# Patient Record
Sex: FEMALE | Race: WHITE | NOT HISPANIC OR LATINO | Employment: OTHER | URBAN - METROPOLITAN AREA
[De-identification: names, ages, dates, MRNs, and addresses within clinical notes are randomized per-mention and may not be internally consistent; named-entity substitution may affect disease eponyms.]

---

## 2018-03-02 ENCOUNTER — NEW PATIENT COMPREHENSIVE (OUTPATIENT)
Dept: URBAN - METROPOLITAN AREA CLINIC 39 | Facility: CLINIC | Age: 68
End: 2018-03-02

## 2018-03-02 DIAGNOSIS — H25.813: ICD-10-CM

## 2018-03-02 DIAGNOSIS — H04.123: ICD-10-CM

## 2018-03-02 DIAGNOSIS — H40.013: ICD-10-CM

## 2018-03-02 DIAGNOSIS — H40.033: ICD-10-CM

## 2018-03-02 PROCEDURE — 92133 CPTRZD OPH DX IMG PST SGM ON: CPT

## 2018-03-02 PROCEDURE — 92132 CPTRZD OPH DX IMG ANT SGM: CPT

## 2018-03-02 PROCEDURE — 92004 COMPRE OPH EXAM NEW PT 1/>: CPT

## 2018-03-02 PROCEDURE — 92015 DETERMINE REFRACTIVE STATE: CPT

## 2018-03-02 ASSESSMENT — TONOMETRY
OD_IOP_MMHG: 12
OS_IOP_MMHG: 12

## 2018-03-02 ASSESSMENT — VISUAL ACUITY
OS_CC: J4
OS_CC: 20/25-1
OD_CC: J3
OS_BAT: 20/400
OD_SC: 20/400
OD_BAT: 20/400
OS_SC: 20/400
OD_CC: 20/40

## 2018-03-27 ENCOUNTER — CATARACT CONSULT (OUTPATIENT)
Dept: URBAN - METROPOLITAN AREA CLINIC 39 | Facility: CLINIC | Age: 68
End: 2018-03-27

## 2018-03-27 VITALS — HEART RATE: 79 BPM | SYSTOLIC BLOOD PRESSURE: 138 MMHG | HEIGHT: 55 IN | DIASTOLIC BLOOD PRESSURE: 72 MMHG

## 2018-03-27 DIAGNOSIS — H40.033: ICD-10-CM

## 2018-03-27 DIAGNOSIS — H04.123: ICD-10-CM

## 2018-03-27 DIAGNOSIS — H25.811: ICD-10-CM

## 2018-03-27 DIAGNOSIS — H25.812: ICD-10-CM

## 2018-03-27 PROCEDURE — 92136TC INTERFEROMETRY - TECHNICAL COMPONENT

## 2018-03-27 PROCEDURE — 92025-2 CORNEAL TOPOGRAPHY, PT

## 2018-03-27 PROCEDURE — 92014 COMPRE OPH EXAM EST PT 1/>: CPT

## 2018-03-27 RX ORDER — DUREZOL 0.5 MG/ML: 1 EMULSION OPHTHALMIC TWICE A DAY

## 2018-03-27 RX ORDER — NEPAFENAC 3 MG/ML: 1 SUSPENSION/ DROPS OPHTHALMIC ONCE A DAY

## 2018-03-27 ASSESSMENT — VISUAL ACUITY
OD_CC: J1
OD_GLARE: 20/400
OS_GLARE: 20/400
OS_SC: <J12
OD_SC: <J12
OD_SC: 20/400
OS_CC: 20/40
OD_CC: 20/40
OS_CC: J3
OS_SC: 20/400

## 2018-03-27 ASSESSMENT — TONOMETRY
OS_IOP_MMHG: 13
OD_IOP_MMHG: 13

## 2018-04-26 ENCOUNTER — SURGERY/PROCEDURE (OUTPATIENT)
Dept: URBAN - METROPOLITAN AREA CLINIC 39 | Facility: CLINIC | Age: 68
End: 2018-04-26

## 2018-04-26 DIAGNOSIS — H25.811: ICD-10-CM

## 2018-04-26 PROCEDURE — 66984AV REMOVE CATARACT, INSERT ADVANCED LENS

## 2018-04-26 PROCEDURE — 65772LRI LRI DURING CAT SX

## 2018-04-26 PROCEDURE — 66999LNSR LENSAR LASER FOR CAT SX

## 2018-04-27 ENCOUNTER — CATARACT POST-OP 1-DAY (OUTPATIENT)
Dept: URBAN - METROPOLITAN AREA CLINIC 39 | Facility: CLINIC | Age: 68
End: 2018-04-27

## 2018-04-27 DIAGNOSIS — Z96.1: ICD-10-CM

## 2018-04-27 PROCEDURE — 99024 POSTOP FOLLOW-UP VISIT: CPT

## 2018-04-27 ASSESSMENT — VISUAL ACUITY
OS_SC: <J12
OD_SC: J2+1
OD_SC: 20/40
OS_SC: 20/400

## 2018-04-27 ASSESSMENT — TONOMETRY
OD_IOP_MMHG: 15
OS_IOP_MMHG: 11

## 2018-04-30 ENCOUNTER — POST OP/EVAL OF SECOND EYE (OUTPATIENT)
Dept: URBAN - METROPOLITAN AREA CLINIC 39 | Facility: CLINIC | Age: 68
End: 2018-04-30

## 2018-04-30 DIAGNOSIS — Z96.1: ICD-10-CM

## 2018-04-30 DIAGNOSIS — H25.812: ICD-10-CM

## 2018-04-30 PROCEDURE — 99024 POSTOP FOLLOW-UP VISIT: CPT

## 2018-04-30 PROCEDURE — 92012 INTRM OPH EXAM EST PATIENT: CPT

## 2018-04-30 ASSESSMENT — TONOMETRY
OD_IOP_MMHG: 17
OS_IOP_MMHG: 15

## 2018-04-30 ASSESSMENT — VISUAL ACUITY
OS_BAT: 20/400
OS_SC: 20/400
OD_SC: 20/30+2
OD_SC: J3
OD_OTHER: J3 @ 30"

## 2018-05-03 ENCOUNTER — FOLLOW UP (OUTPATIENT)
Dept: URBAN - METROPOLITAN AREA SURGERY 14 | Facility: SURGERY | Age: 68
End: 2018-05-03

## 2018-05-03 ENCOUNTER — SURGERY/PROCEDURE (OUTPATIENT)
Dept: URBAN - METROPOLITAN AREA CLINIC 39 | Facility: CLINIC | Age: 68
End: 2018-05-03

## 2018-05-03 DIAGNOSIS — H25.812: ICD-10-CM

## 2018-05-03 PROCEDURE — 65772LRI LRI DURING CAT SX

## 2018-05-03 PROCEDURE — 66984AV REMOVE CATARACT, INSERT ADVANCED LENS

## 2018-05-03 PROCEDURE — 99212 OFFICE O/P EST SF 10 MIN: CPT

## 2018-05-03 PROCEDURE — 66999LNSR LENSAR LASER FOR CAT SX

## 2018-05-03 ASSESSMENT — VISUAL ACUITY
OD_PH: 20/30-2
OD_SC: 20/40
OS_SC: J12>
OS_SC: 20/400
OS_GLARE: 20/400
OS_PH: 20/40

## 2018-05-04 ENCOUNTER — CATARACT POST-OP 1-DAY (OUTPATIENT)
Dept: URBAN - METROPOLITAN AREA CLINIC 39 | Facility: CLINIC | Age: 68
End: 2018-05-04

## 2018-05-04 DIAGNOSIS — Z96.1: ICD-10-CM

## 2018-05-04 PROCEDURE — 99024 POSTOP FOLLOW-UP VISIT: CPT

## 2018-05-04 ASSESSMENT — VISUAL ACUITY
OD_OTHER: J2 @ 30"
OS_SC: 20/100
OU_OTHER: J3 @ 30"
OU_SC: J1
OS_OTHER: J5 @ 30"
OU_SC: 20/50+1
OD_SC: J2
OS_SC: J2

## 2018-05-04 ASSESSMENT — TONOMETRY
OS_IOP_MMHG: 22
OD_IOP_MMHG: 14
OS_IOP_MMHG: 24
OD_IOP_MMHG: 20

## 2018-05-25 ENCOUNTER — POST-OP CATARACT (OUTPATIENT)
Dept: URBAN - METROPOLITAN AREA CLINIC 39 | Facility: CLINIC | Age: 68
End: 2018-05-25

## 2018-05-25 DIAGNOSIS — Z96.1: ICD-10-CM

## 2018-05-25 PROCEDURE — 99024 POSTOP FOLLOW-UP VISIT: CPT

## 2018-05-25 RX ORDER — CARBOXYMETHYLCELLULOSE SODIUM AND GLYCERIN 5; 9 MG/ML; MG/ML: 1 SOLUTION/ DROPS OPHTHALMIC EVERY EVENING

## 2018-05-25 ASSESSMENT — VISUAL ACUITY
OS_SC: 20/50-2
OU_SC: 20/40
OD_SC: 20/60
OS_SC: J1
OU_SC: J1
OD_SC: J1

## 2018-05-25 ASSESSMENT — TONOMETRY
OD_IOP_MMHG: 10
OS_IOP_MMHG: 10

## 2018-06-25 ENCOUNTER — POST-OP CATARACT (OUTPATIENT)
Dept: URBAN - METROPOLITAN AREA CLINIC 39 | Facility: CLINIC | Age: 68
End: 2018-06-25

## 2018-06-25 DIAGNOSIS — Z96.1: ICD-10-CM

## 2018-06-25 PROCEDURE — 99024 POSTOP FOLLOW-UP VISIT: CPT

## 2018-06-25 ASSESSMENT — VISUAL ACUITY
OD_OTHER: J3 @ 30"
OU_OTHER: J2 @ 30"
OU_SC: 20/40
OS_OTHER: J2 @ 30"
OD_SC: J2
OS_SC: J2
OD_SC: 20/80
OS_SC: 20/40
OU_SC: J1+

## 2018-06-25 ASSESSMENT — TONOMETRY
OD_IOP_MMHG: 11
OS_IOP_MMHG: 12

## 2018-07-09 ENCOUNTER — POST-OP CATARACT (OUTPATIENT)
Dept: URBAN - METROPOLITAN AREA CLINIC 39 | Facility: CLINIC | Age: 68
End: 2018-07-09

## 2018-07-09 DIAGNOSIS — Z96.1: ICD-10-CM

## 2018-07-09 PROCEDURE — 99024 POSTOP FOLLOW-UP VISIT: CPT

## 2018-07-09 ASSESSMENT — TONOMETRY
OD_IOP_MMHG: 15
OS_IOP_MMHG: 14

## 2018-07-09 ASSESSMENT — VISUAL ACUITY
OD_SC: J1
OS_SC: J1+
OD_PH: 20/40
OS_SC: 20/70
OS_PH: 20/40
OD_SC: 20/70

## 2018-08-08 ENCOUNTER — ESTABLISHED PATIENT (OUTPATIENT)
Dept: URBAN - METROPOLITAN AREA CLINIC 39 | Facility: CLINIC | Age: 68
End: 2018-08-08

## 2018-08-08 ENCOUNTER — SURGERY/PROCEDURE (OUTPATIENT)
Dept: URBAN - METROPOLITAN AREA SURGERY 14 | Facility: SURGERY | Age: 68
End: 2018-08-08

## 2018-08-08 VITALS — HEART RATE: 67 BPM | SYSTOLIC BLOOD PRESSURE: 164 MMHG | HEIGHT: 55 IN | DIASTOLIC BLOOD PRESSURE: 95 MMHG

## 2018-08-08 DIAGNOSIS — H26.491: ICD-10-CM

## 2018-08-08 DIAGNOSIS — H26.492: ICD-10-CM

## 2018-08-08 PROCEDURE — 66821 AFTER CATARACT LASER SURGERY: CPT

## 2018-08-08 PROCEDURE — G8785 BP SCRN NO PERF AT INTERVAL: HCPCS

## 2018-08-08 PROCEDURE — 4040F PNEUMOC VAC/ADMIN/RCVD: CPT

## 2018-08-08 PROCEDURE — G8482 FLU IMMUNIZE ORDER/ADMIN: HCPCS

## 2018-08-08 PROCEDURE — 92014 COMPRE OPH EXAM EST PT 1/>: CPT

## 2018-08-08 PROCEDURE — 1036F TOBACCO NON-USER: CPT

## 2018-08-08 PROCEDURE — G8428 CUR MEDS NOT DOCUMENT: HCPCS

## 2018-08-08 ASSESSMENT — TONOMETRY
OS_IOP_MMHG: 10
OD_IOP_MMHG: 10

## 2018-08-08 ASSESSMENT — VISUAL ACUITY
OD_SC: J2
OS_SC: 20/40
OD_SC: 20/40
OD_GLARE: 20/80
OS_SC: J3

## 2018-08-15 ENCOUNTER — ESTABLISHED PATIENT (OUTPATIENT)
Dept: URBAN - METROPOLITAN AREA CLINIC 39 | Facility: CLINIC | Age: 68
End: 2018-08-15

## 2018-08-15 ENCOUNTER — SURGERY/PROCEDURE (OUTPATIENT)
Dept: URBAN - METROPOLITAN AREA SURGERY 14 | Facility: SURGERY | Age: 68
End: 2018-08-15

## 2018-08-15 DIAGNOSIS — H26.492: ICD-10-CM

## 2018-08-15 PROCEDURE — 66821 AFTER CATARACT LASER SURGERY: CPT

## 2018-08-15 PROCEDURE — 99213 OFFICE O/P EST LOW 20 MIN: CPT

## 2018-08-15 ASSESSMENT — VISUAL ACUITY
OS_SC: 20/40+1
OD_SC: 20/40+1
OU_SC: 20/30+1
OS_GLARE: 20/70

## 2018-08-15 ASSESSMENT — TONOMETRY
OS_IOP_MMHG: 11
OD_IOP_MMHG: 11

## 2018-08-29 ENCOUNTER — YAG POST-OP (OUTPATIENT)
Dept: URBAN - METROPOLITAN AREA CLINIC 39 | Facility: CLINIC | Age: 68
End: 2018-08-29

## 2018-08-29 DIAGNOSIS — Z98.890: ICD-10-CM

## 2018-08-29 PROCEDURE — 99024 POSTOP FOLLOW-UP VISIT: CPT

## 2018-08-29 ASSESSMENT — VISUAL ACUITY
OU_SC: 20/30 FUZZY
OS_SC: J2
OU_SC: J1+
OD_SC: J1

## 2018-08-29 ASSESSMENT — TONOMETRY
OS_IOP_MMHG: 10
OD_IOP_MMHG: 9

## 2018-09-12 ENCOUNTER — POST-OP (OUTPATIENT)
Dept: URBAN - METROPOLITAN AREA CLINIC 39 | Facility: CLINIC | Age: 68
End: 2018-09-12

## 2018-09-12 DIAGNOSIS — Z96.1: ICD-10-CM

## 2018-09-12 DIAGNOSIS — Z98.890: ICD-10-CM

## 2018-09-12 PROCEDURE — 99024 POSTOP FOLLOW-UP VISIT: CPT

## 2018-09-12 PROCEDURE — 92025NC COMP. CORNEAL TOPO, UNI OR BILAT,

## 2018-09-12 RX ORDER — AMOXICILLIN 500 MG/1: 1 CAPSULE ORAL

## 2018-09-12 RX ORDER — PREDNISOLONE ACETATE 10 MG/ML: 1 SUSPENSION/ DROPS OPHTHALMIC

## 2018-09-12 ASSESSMENT — VISUAL ACUITY
OU_SC: 20/40
OS_SC: J3
OD_SC: 20/60-1
OS_SC: 20/40
OD_SC: J1
OU_SC: J1

## 2018-09-12 ASSESSMENT — TONOMETRY
OS_IOP_MMHG: 14
OD_IOP_MMHG: 14

## 2018-10-04 ENCOUNTER — SURGERY/PROCEDURE (OUTPATIENT)
Dept: URBAN - METROPOLITAN AREA CLINIC 39 | Facility: CLINIC | Age: 68
End: 2018-10-04

## 2018-10-04 DIAGNOSIS — H52.7: ICD-10-CM

## 2018-10-04 PROCEDURE — 66999ISPP INTRALASE LASIK S/P ADVANCED / CUSTOM VISION < 12 MONTHS

## 2018-10-05 ENCOUNTER — 1 DAY LASIK POST OP (OUTPATIENT)
Dept: URBAN - METROPOLITAN AREA CLINIC 39 | Facility: CLINIC | Age: 68
End: 2018-10-05

## 2018-10-05 DIAGNOSIS — Z98.890: ICD-10-CM

## 2018-10-05 PROCEDURE — 99024 POSTOP FOLLOW-UP VISIT: CPT

## 2018-10-05 ASSESSMENT — VISUAL ACUITY
OU_SC: J1
OD_SC: J6
OD_SC: 20/25
OS_SC: J1
OS_SC: 20/40
OU_SC: 20/25

## 2018-11-09 ENCOUNTER — LASIK POST OP (OUTPATIENT)
Dept: URBAN - METROPOLITAN AREA CLINIC 39 | Facility: CLINIC | Age: 68
End: 2018-11-09

## 2018-11-09 DIAGNOSIS — Z98.890: ICD-10-CM

## 2018-11-09 PROCEDURE — 99024 POSTOP FOLLOW-UP VISIT: CPT

## 2018-11-09 ASSESSMENT — VISUAL ACUITY
OD_SC: J2
OS_SC: 20/40
OU_SC: 20/25
OU_SC: J1-
OS_SC: J2
OD_SC: 20/25

## 2018-11-09 ASSESSMENT — TONOMETRY
OD_IOP_MMHG: 13
OS_IOP_MMHG: 13

## 2019-01-14 ENCOUNTER — LASIK POST OP (OUTPATIENT)
Dept: URBAN - METROPOLITAN AREA CLINIC 39 | Facility: CLINIC | Age: 69
End: 2019-01-14

## 2019-01-14 DIAGNOSIS — Z98.890: ICD-10-CM

## 2019-01-14 PROCEDURE — 99024 POSTOP FOLLOW-UP VISIT: CPT

## 2019-01-14 ASSESSMENT — VISUAL ACUITY
OS_SC: 20/40
OU_SC: J1+
OU_SC: 20/25-2
OS_SC: J1+
OD_SC: J1
OD_SC: 20/25-2

## 2019-01-14 ASSESSMENT — TONOMETRY
OD_IOP_MMHG: 11
OS_IOP_MMHG: 11

## 2020-01-17 ENCOUNTER — ESTABLISHED COMPREHENSIVE EXAM (OUTPATIENT)
Dept: URBAN - METROPOLITAN AREA CLINIC 39 | Facility: CLINIC | Age: 70
End: 2020-01-17

## 2020-01-17 DIAGNOSIS — H43.391: ICD-10-CM

## 2020-01-17 DIAGNOSIS — H04.123: ICD-10-CM

## 2020-01-17 DIAGNOSIS — H43.393: ICD-10-CM

## 2020-01-17 PROCEDURE — 92014 COMPRE OPH EXAM EST PT 1/>: CPT

## 2020-01-17 PROCEDURE — 92015 DETERMINE REFRACTIVE STATE: CPT

## 2020-01-17 ASSESSMENT — VISUAL ACUITY
OS_PH: 20/30
OD_SC: J1
OD_SC: 20/30
OS_SC: 20/60
OS_SC: J1

## 2020-01-17 ASSESSMENT — TONOMETRY
OS_IOP_MMHG: 14
OD_IOP_MMHG: 12

## 2021-01-21 ENCOUNTER — ESTABLISHED COMPREHENSIVE EXAM (OUTPATIENT)
Dept: URBAN - METROPOLITAN AREA CLINIC 39 | Facility: CLINIC | Age: 71
End: 2021-01-21

## 2021-01-21 DIAGNOSIS — H40.013: ICD-10-CM

## 2021-01-21 DIAGNOSIS — Z96.1: ICD-10-CM

## 2021-01-21 DIAGNOSIS — H43.393: ICD-10-CM

## 2021-01-21 DIAGNOSIS — H04.123: ICD-10-CM

## 2021-01-21 DIAGNOSIS — H52.223: ICD-10-CM

## 2021-01-21 DIAGNOSIS — H43.391: ICD-10-CM

## 2021-01-21 PROCEDURE — 92014 COMPRE OPH EXAM EST PT 1/>: CPT

## 2021-01-21 PROCEDURE — 92015 DETERMINE REFRACTIVE STATE: CPT

## 2021-01-21 ASSESSMENT — VISUAL ACUITY
OD_SC: 20/40
OS_SC: 20/100
OD_SC: J2 @ 23"
OS_SC: J3 @ 20"

## 2021-01-21 ASSESSMENT — TONOMETRY
OD_IOP_MMHG: 13
OS_IOP_MMHG: 15

## 2021-02-26 NOTE — PATIENT DISCUSSION
PHOTOGRAPHS: I have reviewed the external ocular photographs of this patient which show the following: large, soft, mass involving left upper eyelid laterally

## 2021-02-26 NOTE — PATIENT DISCUSSION
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 Discussed the r/b of excision in the OR with the patient today. Due to patient's existing record at [address], recommend patient see Dr. Bryce Leon at [facility] to pursue complete excision. Patient and wife voiced understanding and will schedule with Dr. Bryce Leon.

## 2022-02-03 ENCOUNTER — COMPREHENSIVE EXAM (OUTPATIENT)
Dept: URBAN - METROPOLITAN AREA CLINIC 39 | Facility: CLINIC | Age: 72
End: 2022-02-03

## 2022-02-03 DIAGNOSIS — H04.123: ICD-10-CM

## 2022-02-03 DIAGNOSIS — G43.B0: ICD-10-CM

## 2022-02-03 DIAGNOSIS — H43.393: ICD-10-CM

## 2022-02-03 DIAGNOSIS — H20.00: ICD-10-CM

## 2022-02-03 DIAGNOSIS — H02.88B: ICD-10-CM

## 2022-02-03 DIAGNOSIS — H43.391: ICD-10-CM

## 2022-02-03 DIAGNOSIS — H02.822: ICD-10-CM

## 2022-02-03 DIAGNOSIS — H02.88A: ICD-10-CM

## 2022-02-03 DIAGNOSIS — H40.013: ICD-10-CM

## 2022-02-03 DIAGNOSIS — E05.00: ICD-10-CM

## 2022-02-03 PROCEDURE — 68761Q PUNCTAL PLUG/QUINTESS DISSOLVABLE PLUG/EACH

## 2022-02-03 PROCEDURE — A4262 TEMPORARY TEAR DUCT PLUG: HCPCS

## 2022-02-03 PROCEDURE — 92014 COMPRE OPH EXAM EST PT 1/>: CPT

## 2022-02-03 PROCEDURE — 92015 DETERMINE REFRACTIVE STATE: CPT

## 2022-02-03 ASSESSMENT — VISUAL ACUITY
OS_SC: J1
OD_SC: J2
OU_SC: J1+
OD_SC: 20/30
OS_SC: 20/70
OU_SC: 20/30
OS_PH: 20/40

## 2022-02-03 ASSESSMENT — TONOMETRY
OD_IOP_MMHG: 12
OS_IOP_MMHG: 12

## 2022-02-28 ENCOUNTER — ESTABLISHED PATIENT (OUTPATIENT)
Dept: URBAN - METROPOLITAN AREA CLINIC 39 | Facility: CLINIC | Age: 72
End: 2022-02-28

## 2022-02-28 DIAGNOSIS — D23.112: ICD-10-CM

## 2022-02-28 DIAGNOSIS — H02.822: ICD-10-CM

## 2022-02-28 PROCEDURE — 67840 REMOVE EYELID LESION: CPT

## 2022-02-28 PROCEDURE — 99213 OFFICE O/P EST LOW 20 MIN: CPT

## 2022-02-28 PROCEDURE — 92285 EXTERNAL OCULAR PHOTOGRAPHY: CPT

## 2022-02-28 RX ORDER — ERYTHROMYCIN 5 MG/G
OINTMENT OPHTHALMIC
End: 2022-03-14

## 2022-02-28 ASSESSMENT — VISUAL ACUITY
OS_SC: 20/70
OS_PH: 20/40
OD_SC: 20/30

## 2022-06-08 ENCOUNTER — EMERGENCY VISIT (OUTPATIENT)
Dept: URBAN - METROPOLITAN AREA CLINIC 39 | Facility: CLINIC | Age: 72
End: 2022-06-08

## 2022-06-08 DIAGNOSIS — H02.88A: ICD-10-CM

## 2022-06-08 DIAGNOSIS — H10.32: ICD-10-CM

## 2022-06-08 DIAGNOSIS — H02.88B: ICD-10-CM

## 2022-06-08 DIAGNOSIS — H04.123: ICD-10-CM

## 2022-06-08 PROCEDURE — 92012 INTRM OPH EXAM EST PATIENT: CPT

## 2022-06-08 RX ORDER — NEOMYCIN SULFATE, POLYMYXIN B SULFATE AND DEXAMETHASONE 3.5; 10000; 1 MG/ML; [USP'U]/ML; MG/ML: 1 SUSPENSION OPHTHALMIC

## 2022-06-08 RX ORDER — MINERAL OIL, PETROLATUM 425; 573 MG/G; MG/G: OINTMENT OPHTHALMIC EVERY EVENING

## 2022-06-08 ASSESSMENT — VISUAL ACUITY
OS_SC: 20/70
OD_SC: 20/30
OU_SC: 20/30

## 2022-06-08 ASSESSMENT — TONOMETRY
OS_IOP_MMHG: 12
OD_IOP_MMHG: 12

## 2022-08-12 ENCOUNTER — FOLLOW UP (OUTPATIENT)
Dept: URBAN - METROPOLITAN AREA CLINIC 39 | Facility: CLINIC | Age: 72
End: 2022-08-12

## 2022-08-12 DIAGNOSIS — H02.88B: ICD-10-CM

## 2022-08-12 DIAGNOSIS — H02.822: ICD-10-CM

## 2022-08-12 DIAGNOSIS — H02.88A: ICD-10-CM

## 2022-08-12 DIAGNOSIS — H04.123: ICD-10-CM

## 2022-08-12 PROCEDURE — 92012 INTRM OPH EXAM EST PATIENT: CPT

## 2022-08-12 PROCEDURE — 68761Q PUNCTAL PLUG/QUINTESS DISSOLVABLE PLUG/EACH

## 2022-08-12 PROCEDURE — A4262 TEMPORARY TEAR DUCT PLUG: HCPCS

## 2022-08-12 ASSESSMENT — TONOMETRY
OS_IOP_MMHG: 12
OD_IOP_MMHG: 13

## 2022-08-12 ASSESSMENT — VISUAL ACUITY
OD_SC: 20/30
OU_SC: 20/30
OS_SC: 20/60-2